# Patient Record
Sex: MALE | Race: BLACK OR AFRICAN AMERICAN
[De-identification: names, ages, dates, MRNs, and addresses within clinical notes are randomized per-mention and may not be internally consistent; named-entity substitution may affect disease eponyms.]

---

## 2018-11-26 ENCOUNTER — HOSPITAL ENCOUNTER (EMERGENCY)
Dept: HOSPITAL 35 - ER | Age: 48
Discharge: HOME | End: 2018-11-26
Payer: COMMERCIAL

## 2018-11-26 VITALS — BODY MASS INDEX: 25.82 KG/M2 | HEIGHT: 76 IN | WEIGHT: 212 LBS

## 2018-11-26 VITALS — SYSTOLIC BLOOD PRESSURE: 159 MMHG | DIASTOLIC BLOOD PRESSURE: 87 MMHG

## 2018-11-26 DIAGNOSIS — M25.561: Primary | ICD-10-CM

## 2018-11-26 LAB
ANION GAP SERPL CALC-SCNC: 9 MMOL/L (ref 7–16)
BASOPHILS NFR BLD AUTO: 1.1 % (ref 0–2)
BF MACROPHAGE: 33
BF NEUTROPHILS: 64
BF NUCLEATED CELLS: 403
BF RBC: 3425
BUN SERPL-MCNC: 16 MG/DL (ref 7–18)
CALCIUM SERPL-MCNC: 9.2 MG/DL (ref 8.5–10.1)
CHLORIDE SERPL-SCNC: 102 MMOL/L (ref 98–107)
CLARITY UR: (no result)
CO2 SERPL-SCNC: 26 MMOL/L (ref 21–32)
COLOR UR: YELLOW
CREAT SERPL-MCNC: 1.1 MG/DL (ref 0.7–1.3)
EOSINOPHIL NFR BLD: 1.8 % (ref 0–3)
ERYTHROCYTE [DISTWIDTH] IN BLOOD BY AUTOMATED COUNT: 13.4 % (ref 10.5–14.5)
GLUCOSE SERPL-MCNC: 113 MG/DL (ref 74–106)
GRANULOCYTES NFR BLD MANUAL: 64 % (ref 36–66)
HCT VFR BLD CALC: 40 % (ref 42–52)
HGB BLD-MCNC: 14 GM/DL (ref 14–18)
LYMPHOCYTES NFR BLD AUTO: 27.1 % (ref 24–44)
MCH RBC QN AUTO: 30.8 PG (ref 26–34)
MCHC RBC AUTO-ENTMCNC: 35.1 G/DL (ref 28–37)
MCV RBC: 87.9 FL (ref 80–100)
MONOCYTES NFR BLD: 6 % (ref 1–8)
NEUTROPHILS # BLD: 5.4 THOU/UL (ref 1.4–8.2)
PLATELET # BLD: 345 THOU/UL (ref 150–400)
POTASSIUM SERPL-SCNC: 3.6 MMOL/L (ref 3.5–5.1)
RBC # BLD AUTO: 4.55 MIL/UL (ref 4.5–6)
SODIUM SERPL-SCNC: 137 MMOL/L (ref 136–145)
SOURCE: (no result)
SOURCE: (no result)
SPECIMEN VOL 24H UR: 10 ML
WBC # BLD AUTO: 8.4 THOU/UL (ref 4–11)

## 2018-11-27 LAB
BODY FLUID LDH: 106 IU/L
GLUCOSE FLD-MCNC: 100 MG/DL

## 2018-11-28 LAB — SOURCE: (no result)

## 2020-02-04 ENCOUNTER — HOSPITAL ENCOUNTER (EMERGENCY)
Dept: HOSPITAL 35 - ER | Age: 50
Discharge: HOME | End: 2020-02-04
Payer: COMMERCIAL

## 2020-02-04 VITALS — BODY MASS INDEX: 26.42 KG/M2 | HEIGHT: 76 IN | WEIGHT: 217 LBS

## 2020-02-04 VITALS — DIASTOLIC BLOOD PRESSURE: 96 MMHG | SYSTOLIC BLOOD PRESSURE: 157 MMHG

## 2020-02-04 DIAGNOSIS — Z98.890: ICD-10-CM

## 2020-02-04 DIAGNOSIS — M25.562: Primary | ICD-10-CM

## 2020-02-04 DIAGNOSIS — R42: ICD-10-CM

## 2020-07-30 ENCOUNTER — HOSPITAL ENCOUNTER (EMERGENCY)
Dept: HOSPITAL 35 - ER | Age: 50
Discharge: HOME | End: 2020-07-30
Payer: COMMERCIAL

## 2020-07-30 VITALS — WEIGHT: 217 LBS | BODY MASS INDEX: 26.42 KG/M2 | HEIGHT: 76 IN

## 2020-07-30 VITALS — DIASTOLIC BLOOD PRESSURE: 69 MMHG | SYSTOLIC BLOOD PRESSURE: 161 MMHG

## 2020-07-30 DIAGNOSIS — Z98.890: ICD-10-CM

## 2020-07-30 DIAGNOSIS — F41.0: Primary | ICD-10-CM

## 2020-07-30 DIAGNOSIS — Z79.899: ICD-10-CM

## 2020-07-30 DIAGNOSIS — R42: ICD-10-CM

## 2020-07-30 LAB
ALBUMIN SERPL-MCNC: 4.2 G/DL (ref 3.4–5)
ALT SERPL-CCNC: 45 U/L (ref 30–65)
ANION GAP SERPL CALC-SCNC: 10 MMOL/L (ref 7–16)
AST SERPL-CCNC: 35 U/L (ref 15–37)
BASOPHILS NFR BLD AUTO: 1.2 % (ref 0–2)
BILIRUB SERPL-MCNC: 0.6 MG/DL (ref 0.2–1)
BILIRUB UR-MCNC: NEGATIVE MG/DL
BUN SERPL-MCNC: 14 MG/DL (ref 7–18)
CALCIUM SERPL-MCNC: 8.5 MG/DL (ref 8.5–10.1)
CHLORIDE SERPL-SCNC: 99 MMOL/L (ref 98–107)
CO2 SERPL-SCNC: 27 MMOL/L (ref 21–32)
COLOR UR: YELLOW
CREAT SERPL-MCNC: 1.2 MG/DL (ref 0.7–1.3)
EOSINOPHIL NFR BLD: 4.1 % (ref 0–3)
ERYTHROCYTE [DISTWIDTH] IN BLOOD BY AUTOMATED COUNT: 15.1 % (ref 10.5–14.5)
GLUCOSE SERPL-MCNC: 110 MG/DL (ref 74–106)
GRANULOCYTES NFR BLD MANUAL: 44.3 % (ref 36–66)
HCT VFR BLD CALC: 44.4 % (ref 42–52)
HGB BLD-MCNC: 15.1 GM/DL (ref 14–18)
KETONES UR STRIP-MCNC: NEGATIVE MG/DL
LYMPHOCYTES NFR BLD AUTO: 43.5 % (ref 24–44)
MCH RBC QN AUTO: 29 PG (ref 26–34)
MCHC RBC AUTO-ENTMCNC: 33.9 G/DL (ref 28–37)
MCV RBC: 85.5 FL (ref 80–100)
MONOCYTES NFR BLD: 6.9 % (ref 1–8)
NEUTROPHILS # BLD: 3.5 THOU/UL (ref 1.4–8.2)
PLATELET # BLD: 374 THOU/UL (ref 150–400)
POTASSIUM SERPL-SCNC: 3.3 MMOL/L (ref 3.5–5.1)
PROT SERPL-MCNC: 8.5 G/DL (ref 6.4–8.2)
RBC # BLD AUTO: 5.19 MIL/UL (ref 4.5–6)
RBC # UR STRIP: NEGATIVE /UL
SODIUM SERPL-SCNC: 136 MMOL/L (ref 136–145)
SP GR UR STRIP: <= 1.005 (ref 1–1.03)
TROPONIN I SERPL-MCNC: <0.06 NG/ML (ref ?–0.06)
URINE CLARITY: CLEAR
URINE GLUCOSE-RANDOM*: NEGATIVE
URINE LEUKOCYTES-REFLEX: NEGATIVE
URINE NITRITE-REFLEX: NEGATIVE
URINE PROTEIN (DIPSTICK): NEGATIVE
UROBILINOGEN UR STRIP-ACNC: 0.2 E.U./DL (ref 0.2–1)
WBC # BLD AUTO: 7.9 THOU/UL (ref 4–11)

## 2020-07-31 NOTE — EKG
[facility]
[address]ius [place]   [address]                     ELECTROCARDIOGRAM REPORT      
_______________________________________________________________________________
 
Name:       VONNIE AMBRIZ                 [facility] #:                     [facility]      [id number]                       Account #:      [id number]  
Admission:  20    Attend Phys:                          
Discharge:  20    Date of Birth:  [birth year]  
                                                          Report #: [id number]
                                                                    [id number]
_______________________________________________________________________________
THIS REPORT FOR:  
 
cc:  HENRY - Leena family physician/PCP 
     HENRY - Leena family physician/PCP 
     Josue Norton MD [facility]
THIS REPORT FOR:   //name//                          
 
                         [facility] ED
                                       
Test Date:    2020               Test Time:    15:42:36
Pat Name:     VONNIE AMBRIZ            Department:   
Patient ID:   [id number]            Room:          
Gender:                               Technician:   ELISE
:          1970               Requested By: Linwood Goodwin
Order Number: [id number] MD:   Josue Norton
                                 Measurements
Intervals                              Axis          
Rate:         67                       P:            60
RI:           183                      QRS:          35
QRSD:         95                       T:            55
QT:           478                                    
QTc:          505                                    
                           Interpretive Statements
Sinus rhythm
Atrial premature complex
RSR' in V1 or V2, probably normal variant
Borderline T wave abnormalities
Prolonged QT interval
Compared to ECG 2016 10:45:36
Atrial premature complex(es) now present
RSR' in V1 or V2 now present
T-wave abnormality now present
Electronically Signed On 2020 9:18:41 CDT by Josue Norton
[email or web address]
 
 
 
 
 
 
 
 
 
 
  <ELECTRONICALLY SIGNED>
   By: Josue Norton MD, [facility]   
  20     0918
D: 20                           _____________________________________
T: 20 154                           Josue Norton MD, [facility]     /EPI

## 2020-10-03 ENCOUNTER — HOSPITAL ENCOUNTER (INPATIENT)
Dept: HOSPITAL 35 - ER | Age: 50
LOS: 2 days | Discharge: HOME | DRG: 305 | End: 2020-10-05
Attending: INTERNAL MEDICINE | Admitting: INTERNAL MEDICINE
Payer: COMMERCIAL

## 2020-10-03 VITALS — SYSTOLIC BLOOD PRESSURE: 179 MMHG | DIASTOLIC BLOOD PRESSURE: 100 MMHG

## 2020-10-03 VITALS — DIASTOLIC BLOOD PRESSURE: 105 MMHG | SYSTOLIC BLOOD PRESSURE: 168 MMHG

## 2020-10-03 VITALS — DIASTOLIC BLOOD PRESSURE: 115 MMHG | SYSTOLIC BLOOD PRESSURE: 202 MMHG

## 2020-10-03 VITALS — HEIGHT: 75.98 IN | BODY MASS INDEX: 26.91 KG/M2 | WEIGHT: 221 LBS

## 2020-10-03 VITALS — DIASTOLIC BLOOD PRESSURE: 98 MMHG | SYSTOLIC BLOOD PRESSURE: 158 MMHG

## 2020-10-03 DIAGNOSIS — F41.9: ICD-10-CM

## 2020-10-03 DIAGNOSIS — R20.0: ICD-10-CM

## 2020-10-03 DIAGNOSIS — K21.9: ICD-10-CM

## 2020-10-03 DIAGNOSIS — G44.209: ICD-10-CM

## 2020-10-03 DIAGNOSIS — I10: ICD-10-CM

## 2020-10-03 DIAGNOSIS — Z79.899: ICD-10-CM

## 2020-10-03 DIAGNOSIS — R53.1: ICD-10-CM

## 2020-10-03 DIAGNOSIS — I16.0: Primary | ICD-10-CM

## 2020-10-03 DIAGNOSIS — Z86.73: ICD-10-CM

## 2020-10-03 LAB
ANION GAP SERPL CALC-SCNC: 9 MMOL/L (ref 7–16)
BASOPHILS NFR BLD AUTO: 0.8 % (ref 0–2)
BUN SERPL-MCNC: 17 MG/DL (ref 7–18)
CALCIUM SERPL-MCNC: 8.6 MG/DL (ref 8.5–10.1)
CHLORIDE SERPL-SCNC: 102 MMOL/L (ref 98–107)
CHOLEST SERPL-MCNC: 189 MG/DL (ref ?–200)
CO2 SERPL-SCNC: 28 MMOL/L (ref 21–32)
CREAT SERPL-MCNC: 1.3 MG/DL (ref 0.7–1.3)
EOSINOPHIL NFR BLD: 3.5 % (ref 0–3)
ERYTHROCYTE [DISTWIDTH] IN BLOOD BY AUTOMATED COUNT: 14.4 % (ref 10.5–14.5)
GLUCOSE SERPL-MCNC: 105 MG/DL (ref 74–106)
GRANULOCYTES NFR BLD MANUAL: 48.9 % (ref 36–66)
HCT VFR BLD CALC: 43.1 % (ref 42–52)
HDLC SERPL-MCNC: 74 MG/DL (ref 40–?)
HGB BLD-MCNC: 14.7 GM/DL (ref 14–18)
LDLC SERPL-MCNC: 70 MG/DL (ref ?–100)
LYMPHOCYTES NFR BLD AUTO: 40.2 % (ref 24–44)
MCH RBC QN AUTO: 29.8 PG (ref 26–34)
MCHC RBC AUTO-ENTMCNC: 34 G/DL (ref 28–37)
MCV RBC: 87.7 FL (ref 80–100)
MONOCYTES NFR BLD: 6.6 % (ref 1–8)
NEUTROPHILS # BLD: 4 THOU/UL (ref 1.4–8.2)
PLATELET # BLD: 359 THOU/UL (ref 150–400)
POTASSIUM SERPL-SCNC: 4.1 MMOL/L (ref 3.5–5.1)
RBC # BLD AUTO: 4.92 MIL/UL (ref 4.5–6)
SODIUM SERPL-SCNC: 139 MMOL/L (ref 136–145)
TC:HDL: 2.6 RATIO
TRIGL SERPL-MCNC: 228 MG/DL (ref ?–150)
VLDLC SERPL CALC-MCNC: 46 MG/DL (ref ?–40)
WBC # BLD AUTO: 8.1 THOU/UL (ref 4–11)

## 2020-10-03 PROCEDURE — 10081 I&D PILONIDAL CYST COMP: CPT

## 2020-10-04 VITALS — SYSTOLIC BLOOD PRESSURE: 151 MMHG | DIASTOLIC BLOOD PRESSURE: 102 MMHG

## 2020-10-04 VITALS — SYSTOLIC BLOOD PRESSURE: 136 MMHG | DIASTOLIC BLOOD PRESSURE: 92 MMHG

## 2020-10-04 VITALS — SYSTOLIC BLOOD PRESSURE: 154 MMHG | DIASTOLIC BLOOD PRESSURE: 102 MMHG

## 2020-10-04 VITALS — SYSTOLIC BLOOD PRESSURE: 143 MMHG | DIASTOLIC BLOOD PRESSURE: 99 MMHG

## 2020-10-04 VITALS — DIASTOLIC BLOOD PRESSURE: 91 MMHG | SYSTOLIC BLOOD PRESSURE: 138 MMHG

## 2020-10-04 VITALS — DIASTOLIC BLOOD PRESSURE: 100 MMHG | SYSTOLIC BLOOD PRESSURE: 136 MMHG

## 2020-10-04 VITALS — DIASTOLIC BLOOD PRESSURE: 99 MMHG | SYSTOLIC BLOOD PRESSURE: 150 MMHG

## 2020-10-04 NOTE — EKG
[facility]
Julius [address]
[place]   [address]                     ELECTROCARDIOGRAM REPORT      
_______________________________________________________________________________
 
Name:       VONNIE AMBRIZ                 Room #:         200-I       ADM IN  
M.R.#:      [id number]                       Account #:      [id number]  
Admission:  10/03/20    Attend Phys:    Chris Willson
Discharge:              Date of Birth:  [birth year]  
                                                          Report #: [id number]
                                                                    [id number]
_______________________________________________________________________________
THIS REPORT FOR:  
 
cc:  Lady Dan MD, Ghazal A. MD Santiago, Patrick MD [facility]                                         ~
THIS REPORT FOR:   //name//                          
 
                         [facility] ED
                                       
Test Date:    2020-10-03               Test Time:    18:23:57
Pat Name:     VONNIE AMBRIZ            Department:   
Patient ID:   [id number]            Room:         200
Gender:       M                        Technician:   ALEX
:          1970               Requested By: Stephane Cutler
Order Number: [id number] MD:   Dameon Call
                                 Measurements
Intervals                              Axis          
Rate:         64                       P:            52
IN:           178                      QRS:          18
QRSD:         92                       T:            30
QT:           404                                    
QTc:          417                                    
                           Interpretive Statements
Sinus rhythm
Atrial premature complexes
Abnormal R-wave progression, early transition
Compared to ECG 2020 15:42:36
T-wave abnormality no longer present
Prolonged QT interval no longer present
Electronically Signed On 10-4-2020 11:17:44 CDT by Dameon Call
[email or web address]
 
 
 
 
 
 
 
 
 
 
 
 
 
  <ELECTRONICALLY SIGNED>
   By: Dameon Call MD, FACC    
  10/04/20     1117
D: 10/03/20 1823                           _____________________________________
T: 10/03/20 1823                           Dameon Call MD, FAC      /EPI

## 2020-10-04 NOTE — NUR
PATIENT TRANSFERRED FROM ER AT SHIFT CHANGE. ASSUMED CARE OF PATIENT AT
APPROXIMATELY 1900. AT APPROXIMATELY 2350 PATIENT C/O HEADACHE AND DIFFICULTY
SWALLOWING AGAIN. /98. UPDATED NP AND NOTIFIED HER OF PATIENTS HX OF
ANXIETY. OBTAINED ONETIME ORDER FOR XANAX. ADMINISTERED AS ORDERED ALONG WITH
PRN PAIN MEDICATION. PATIENT CURRENTLY RESTING WITH EYES CLOSED. WILL CONTINUE
TO MONITOR.

## 2020-10-04 NOTE — NUR
MILD HEADACHE 3/10, DENIES NEED FOR PAIN MEDICATION AT THIS TIME.
TALKATIVE, SMILING, WATCHING FOOTBALL ON TV.

## 2020-10-05 VITALS — SYSTOLIC BLOOD PRESSURE: 135 MMHG | DIASTOLIC BLOOD PRESSURE: 82 MMHG

## 2020-10-05 VITALS — SYSTOLIC BLOOD PRESSURE: 132 MMHG | DIASTOLIC BLOOD PRESSURE: 86 MMHG

## 2020-10-05 VITALS — DIASTOLIC BLOOD PRESSURE: 82 MMHG | SYSTOLIC BLOOD PRESSURE: 135 MMHG

## 2020-10-05 NOTE — NUR
ASSUMED CARE AT NAGE OF SHIFT. ALERTX4, DENIES PAIN, DENIES SOB, DENIES
HEADACHE, BM CONTROLED. UNABLE TO MANAGE THE MRI. SA ON MONITOR. WILL DC HOME
WITH SELF CARE.

## 2020-10-05 NOTE — NUR
ASSUMED CARE OF PATIENT AT 1900. PATIENT RATED HEADACHE 1/10 AT INITIAL
ASSESSMENT. DECLINED INTERVENTION. PATIENT SINUS MALIK THROUGH NOC. LAST BP
136/92. PATIENT APPEARS TO BE PROGRESSING TOWARDS CARE PLAN GOALS.